# Patient Record
Sex: MALE | Race: OTHER | NOT HISPANIC OR LATINO | URBAN - METROPOLITAN AREA
[De-identification: names, ages, dates, MRNs, and addresses within clinical notes are randomized per-mention and may not be internally consistent; named-entity substitution may affect disease eponyms.]

---

## 2020-01-21 ENCOUNTER — EMERGENCY (EMERGENCY)
Facility: HOSPITAL | Age: 25
LOS: 1 days | Discharge: HOME | End: 2020-01-21
Admitting: EMERGENCY MEDICINE
Payer: MEDICAID

## 2020-01-21 VITALS
DIASTOLIC BLOOD PRESSURE: 86 MMHG | HEIGHT: 73 IN | RESPIRATION RATE: 20 BRPM | TEMPERATURE: 97 F | OXYGEN SATURATION: 97 % | WEIGHT: 164.91 LBS | HEART RATE: 83 BPM | SYSTOLIC BLOOD PRESSURE: 132 MMHG

## 2020-01-21 PROCEDURE — 99284 EMERGENCY DEPT VISIT MOD MDM: CPT | Mod: 57

## 2020-01-21 PROCEDURE — 73562 X-RAY EXAM OF KNEE 3: CPT | Mod: 26,LT

## 2020-01-21 PROCEDURE — 73552 X-RAY EXAM OF FEMUR 2/>: CPT | Mod: 26,LT

## 2020-01-21 PROCEDURE — 27520 TREAT KNEECAP FRACTURE: CPT | Mod: 54,LT

## 2020-01-21 RX ORDER — IBUPROFEN 200 MG
600 TABLET ORAL ONCE
Refills: 0 | Status: COMPLETED | OUTPATIENT
Start: 2020-01-21 | End: 2020-01-21

## 2020-01-21 RX ORDER — OXYCODONE AND ACETAMINOPHEN 5; 325 MG/1; MG/1
1 TABLET ORAL ONCE
Refills: 0 | Status: DISCONTINUED | OUTPATIENT
Start: 2020-01-21 | End: 2020-01-21

## 2020-01-21 RX ORDER — IBUPROFEN 200 MG
1 TABLET ORAL
Qty: 20 | Refills: 0
Start: 2020-01-21

## 2020-01-21 RX ADMIN — Medication 600 MILLIGRAM(S): at 06:45

## 2020-01-21 RX ADMIN — OXYCODONE AND ACETAMINOPHEN 1 TABLET(S): 5; 325 TABLET ORAL at 06:45

## 2020-01-21 NOTE — ED ADULT NURSE NOTE - OBJECTIVE STATEMENT
25 y/o M c/o L knee pn. Pt fainted and broke knee cap on 1/10/20. Pt has been using crutches to ambulate but slipped on ice today and landed on his L knee. Pt unable to bear weight on L leg. PMH of DM, pt uses insulin daily.

## 2020-01-21 NOTE — ED PROVIDER NOTE - PROVIDER TOKENS
PROVIDER:[TOKEN:[[id number]]] PROVIDER:[TOKEN:[[id number]]],FREE:[LAST:[your PMD],PHONE:[(   )    -],FAX:[(   )    -]]

## 2020-01-21 NOTE — ED PROVIDER NOTE - OBJECTIVE STATEMENT
25 yo M with PMHx of type I DM, celiac disease, recently with L patellar fx s/p fall 1/10/20, treated with knee immobilizer, BIBA for L knee pain s/p fall again today. Pt was walking with crutches, slipped on black ice, and "did a split and hit my L knee again." Pain is radiating from L knee to the thigh region with tingling sensation.  Rated 8/10 currently.  Denies head trauma, LOC, break in the skin, paresthesia, numbness, redness, bleeding, d/c, HA, dizziness, SOB, CP, palpitations, N/V, focal weakness, neck/back pain, and malaise. Pt is ambulatory with crutches s/p fall

## 2020-01-21 NOTE — ED ADULT TRIAGE NOTE - CHIEF COMPLAINT QUOTE
Pt with left knee pain after slipping and falling on black ice.  Pt left knee brace and crutches from previous fall and fracture.  History of diabetes and celiac disease.

## 2020-01-21 NOTE — ED PROVIDER NOTE - CARE PROVIDER_API CALL
Claude Mendez)  Orthopaedic Surgery  [address] [place] [address]  Phone: [phone]  Fax: [phone]  Follow Up Time: Claude Mendez)  Orthopaedic Surgery  [address] [place] [address]  Phone: [phone]  Fax: [phone]  Follow Up Time:     your PMD,   Phone: (   )    -  Fax: (   )    -  Follow Up Time:

## 2020-01-21 NOTE — ED ADULT NURSE NOTE - NSIMPLEMENTINTERV_GEN_ALL_ED
Implemented All Fall Risk Interventions:  Eddy to call system. Call bell, personal items and telephone within reach. Instruct patient to call for assistance. Room bathroom lighting operational. Non-slip footwear when patient is off stretcher. Physically safe environment: no spills, clutter or unnecessary equipment. Stretcher in lowest position, wheels locked, appropriate side rails in place. Provide visual cue, wrist band, yellow gown, etc. Monitor gait and stability. Monitor for mental status changes and reorient to person, place, and time. Review medications for side effects contributing to fall risk. Reinforce activity limits and safety measures with patient and family.

## 2020-01-21 NOTE — ED PROVIDER NOTE - DIAGNOSTIC INTERPRETATION
Xray (wet reads) interpreted by TERESA PEDRAZA   xray knee and femur (total 5 views) - +soft tissue swelling siubacute patellar fx, no dislocation, joint space intact, no effusion noted. No foreign body noted

## 2020-01-21 NOTE — ED PROVIDER NOTE - PHYSICAL EXAMINATION
Gen - WDWN M, NAD, comfortable and non-toxic appearing  Skin - warm, dry, intact   HEENT - AT/NC, airway patent, neck supple   CV - S1S2, R/R/R  Resp - CTAB, no r/r/w  GI - soft, ND, NT, no CVAT b/l   MS - w/w/p, L knee +edema and TTP over lateral patellar and distal femur region, no erythema, ecchymosis, crepitus, joint laxity, or deformity, restricted ROM 2/2 pain, NV intact. +SILT, symmetric palpable distal pulses b/l, compartment soft   Neuro - AxOx3, ambulatory with crutches

## 2020-01-21 NOTE — ED PROVIDER NOTE - PROGRESS NOTE DETAILS
, pt is a known DM, already self covered with 12U of humalog PTA to the ED, no polyuria/polydipsia/fever/resp sx otherwise

## 2020-01-21 NOTE — ED PROVIDER NOTE - CARE PLAN
Principal Discharge DX:	Patellar fracture  Secondary Diagnosis:	Fall  Secondary Diagnosis:	Hyperglycemia

## 2020-01-21 NOTE — ED PROVIDER NOTE - CLINICAL SUMMARY MEDICAL DECISION MAKING FREE TEXT BOX
pt with mechanical fall today after recent patellar fx, no associated prodromes, no focal neuro deficits, NV intact, Xray , ACE wrap and knee immobilizer applied, encouraged RICE to affected region, weight bear as tolerated, f/u ortho, pt verbalized understanding. pt with mechanical fall today after recent patellar fx, no associated prodromes, no focal neuro deficits, NV intact, Xray with subacute non displaced patellar fx , ACE wrap and knee immobilizer applied, encouraged RICE to affected region, weight bear as tolerated, f/u ortho,  on arrival, self covered with insulin coverage, rpt FS downtrending, no other associated sx noted, encouraged prompt f/u with PMD and ortho, pt verbalized understanding.

## 2020-01-21 NOTE — ED PROVIDER NOTE - PATIENT PORTAL LINK FT
You can access the FollowMyHealth Patient Portal offered by [facility] by registering at the following website: [email or web address]. By joining [facility]’s FollowMyHealth portal, you will also be able to view your health information using other applications (apps) compatible with our system.
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
## 2020-01-28 DIAGNOSIS — F17.200 NICOTINE DEPENDENCE, UNSPECIFIED, UNCOMPLICATED: ICD-10-CM

## 2020-01-28 DIAGNOSIS — E10.65 TYPE 1 DIABETES MELLITUS WITH HYPERGLYCEMIA: ICD-10-CM

## 2020-01-28 DIAGNOSIS — M25.562 PAIN IN LEFT KNEE: ICD-10-CM
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
## 2020-01-28 DIAGNOSIS — S82.092A OTHER FRACTURE OF LEFT PATELLA, INITIAL ENCOUNTER FOR CLOSED FRACTURE: ICD-10-CM
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
## 2020-09-17 NOTE — ED ADULT NURSE NOTE - NS ED NURSE DC INFO COMPLEXITY
0 = understands/communicates without difficulty Simple: Patient demonstrates quick and easy understanding

## 2022-05-31 NOTE — ED ADULT TRIAGE NOTE - HEIGHT IN INCHES
Received referral for Ambulatory Care Management. Patient will be outreached for CM engagement.    1